# Patient Record
Sex: FEMALE | URBAN - METROPOLITAN AREA
[De-identification: names, ages, dates, MRNs, and addresses within clinical notes are randomized per-mention and may not be internally consistent; named-entity substitution may affect disease eponyms.]

---

## 2022-02-04 ENCOUNTER — WEB ENCOUNTER (OUTPATIENT)
Dept: URBAN - METROPOLITAN AREA CLINIC 54 | Facility: CLINIC | Age: 57
End: 2022-02-04

## 2022-02-04 ENCOUNTER — OFFICE VISIT (OUTPATIENT)
Dept: URBAN - METROPOLITAN AREA CLINIC 54 | Facility: CLINIC | Age: 57
End: 2022-02-04
Payer: COMMERCIAL

## 2022-02-04 VITALS
DIASTOLIC BLOOD PRESSURE: 69 MMHG | HEART RATE: 77 BPM | WEIGHT: 210 LBS | HEIGHT: 67 IN | SYSTOLIC BLOOD PRESSURE: 122 MMHG | TEMPERATURE: 97.3 F | BODY MASS INDEX: 32.96 KG/M2

## 2022-02-04 DIAGNOSIS — K59.09 CHANGE IN BOWEL MOVEMENTS INTERMITTENT CONSTIPATION. URGENCY IN THE MORNING.: ICD-10-CM

## 2022-02-04 DIAGNOSIS — R13.19 ESOPHAGEAL DYSPHAGIA: ICD-10-CM

## 2022-02-04 DIAGNOSIS — K21.9 GASTROESOPHAGEAL REFLUX DISEASE, UNSPECIFIED WHETHER ESOPHAGITIS PRESENT: ICD-10-CM

## 2022-02-04 PROCEDURE — 99244 OFF/OP CNSLTJ NEW/EST MOD 40: CPT

## 2022-02-04 PROCEDURE — 99204 OFFICE O/P NEW MOD 45 MIN: CPT

## 2022-02-04 RX ORDER — EMPAGLIFLOZIN 25 MG/1
1 TABLET TABLET, FILM COATED ORAL ONCE A DAY
Status: ACTIVE | COMMUNITY

## 2022-02-04 RX ORDER — ASPIRIN 81 MG/1
1 TABLET TABLET, COATED ORAL ONCE A DAY
Status: ACTIVE | COMMUNITY

## 2022-02-04 RX ORDER — OMEPRAZOLE 20 MG/1
1 CAPSULE 30 MINUTES BEFORE MORNING MEAL CAPSULE, DELAYED RELEASE ORAL ONCE A DAY
Qty: 90 | Refills: 1 | OUTPATIENT
Start: 2022-02-04

## 2022-02-04 RX ORDER — INSULIN DETEMIR 100 [IU]/ML
AS DIRECTED INJECTION, SOLUTION SUBCUTANEOUS
Status: ACTIVE | COMMUNITY

## 2022-02-04 RX ORDER — LISINOPRIL 40 MG/1
1 TABLET TABLET ORAL ONCE A DAY
Status: ACTIVE | COMMUNITY

## 2022-02-04 RX ORDER — PREGABALIN 100 MG/1
1 CAPSULE CAPSULE ORAL ONCE A DAY
Status: ACTIVE | COMMUNITY

## 2022-02-04 RX ORDER — ATORVASTATIN CALCIUM 80 MG/1
1 TABLET TABLET, FILM COATED ORAL ONCE A DAY
Status: ACTIVE | COMMUNITY

## 2022-02-04 RX ORDER — AMLODIPINE BESYLATE 10 MG/1
1 TABLET TABLET ORAL ONCE A DAY
Status: ACTIVE | COMMUNITY

## 2022-02-04 RX ORDER — INSULIN ASPART 100 [IU]/ML
AS DIRECTED INJECTION, SOLUTION INTRAVENOUS; SUBCUTANEOUS
Status: ACTIVE | COMMUNITY

## 2022-02-04 NOTE — HPI-TODAY'S VISIT:
Patient was referred by Dr. Micheal Blunt for dysphagia. A copy of this document will be sent to the physician. Pt is a 55 yo female with PMH of DM, HIV, and MGUS who complains of reflux, dysphagia, lower abd pain, and constipation. Pt feels like food gets stuck when she swallows, worse with meats. She has not had a food impaction requiring ED visit. Pt does not take anything for heartburn. Pt also complains of constant, dull lower abd pain and constipation. Pain improves with BMs. Has a BM about every 4 days, they are hard and painful with a lot of straining. No bloody stools, melena, or unintentional weight loss. Pt has tried Miralax, dulxolax, exlax, stool softeners, mag citrate without relief. Last EGD was 2020 and was normal. Last colonoscopy was 5640-2451 without polyps. No family history of colon cancer. Pt denies cardiopulmonary disease, anticoagulants or defibrillator. On aspiring 81mg.

## 2022-02-10 ENCOUNTER — OFFICE VISIT (OUTPATIENT)
Dept: URBAN - METROPOLITAN AREA SURGERY CENTER 14 | Facility: SURGERY CENTER | Age: 57
End: 2022-02-10
Payer: COMMERCIAL

## 2022-02-10 DIAGNOSIS — B96.81 BACTERIAL INFECTION DUE TO H. PYLORI: ICD-10-CM

## 2022-02-10 DIAGNOSIS — R13.19 CERVICAL DYSPHAGIA: ICD-10-CM

## 2022-02-10 DIAGNOSIS — K29.60 ADENOPAPILLOMATOSIS GASTRICA: ICD-10-CM

## 2022-02-10 PROCEDURE — 43248 EGD GUIDE WIRE INSERTION: CPT | Performed by: INTERNAL MEDICINE

## 2022-02-10 PROCEDURE — 43239 EGD BIOPSY SINGLE/MULTIPLE: CPT | Performed by: INTERNAL MEDICINE

## 2022-02-10 PROCEDURE — G8907 PT DOC NO EVENTS ON DISCHARG: HCPCS | Performed by: INTERNAL MEDICINE

## 2022-02-17 ENCOUNTER — TELEPHONE ENCOUNTER (OUTPATIENT)
Dept: URBAN - METROPOLITAN AREA CLINIC 92 | Facility: CLINIC | Age: 57
End: 2022-02-17

## 2022-02-24 ENCOUNTER — OFFICE VISIT (OUTPATIENT)
Dept: URBAN - METROPOLITAN AREA CLINIC 54 | Facility: CLINIC | Age: 57
End: 2022-02-24
Payer: COMMERCIAL

## 2022-02-24 VITALS
SYSTOLIC BLOOD PRESSURE: 112 MMHG | BODY MASS INDEX: 33.46 KG/M2 | DIASTOLIC BLOOD PRESSURE: 67 MMHG | HEIGHT: 67 IN | HEART RATE: 84 BPM | TEMPERATURE: 97.3 F | WEIGHT: 213.2 LBS

## 2022-02-24 DIAGNOSIS — A04.8 H. PYLORI INFECTION: ICD-10-CM

## 2022-02-24 DIAGNOSIS — K21.9 GASTROESOPHAGEAL REFLUX DISEASE, UNSPECIFIED WHETHER ESOPHAGITIS PRESENT: ICD-10-CM

## 2022-02-24 DIAGNOSIS — K59.00 CONSTIPATION, UNSPECIFIED CONSTIPATION TYPE: ICD-10-CM

## 2022-02-24 PROCEDURE — 99214 OFFICE O/P EST MOD 30 MIN: CPT

## 2022-02-24 RX ORDER — OMEPRAZOLE 20 MG/1
1 CAPSULE 30 MINUTES BEFORE MORNING MEAL CAPSULE, DELAYED RELEASE ORAL ONCE A DAY
Qty: 90 | Refills: 1 | Status: ACTIVE | COMMUNITY
Start: 2022-02-04

## 2022-02-24 RX ORDER — LISINOPRIL 40 MG/1
1 TABLET TABLET ORAL ONCE A DAY
Status: ACTIVE | COMMUNITY

## 2022-02-24 RX ORDER — AMLODIPINE BESYLATE 10 MG/1
1 TABLET TABLET ORAL ONCE A DAY
Status: ACTIVE | COMMUNITY

## 2022-02-24 RX ORDER — PREGABALIN 100 MG/1
1 CAPSULE CAPSULE ORAL ONCE A DAY
Status: ACTIVE | COMMUNITY

## 2022-02-24 RX ORDER — INSULIN ASPART 100 [IU]/ML
AS DIRECTED INJECTION, SOLUTION INTRAVENOUS; SUBCUTANEOUS
Status: ACTIVE | COMMUNITY

## 2022-02-24 RX ORDER — EMPAGLIFLOZIN 25 MG/1
1 TABLET TABLET, FILM COATED ORAL ONCE A DAY
Status: ACTIVE | COMMUNITY

## 2022-02-24 RX ORDER — INSULIN DETEMIR 100 [IU]/ML
AS DIRECTED INJECTION, SOLUTION SUBCUTANEOUS
Status: ACTIVE | COMMUNITY

## 2022-02-24 RX ORDER — ATORVASTATIN CALCIUM 80 MG/1
1 TABLET TABLET, FILM COATED ORAL ONCE A DAY
Status: ACTIVE | COMMUNITY

## 2022-02-24 RX ORDER — FLUCONAZOLE 150 MG/1
1 TABLET TABLET ORAL
Qty: 1 | Refills: 0 | OUTPATIENT
Start: 2022-02-24 | End: 2022-02-25

## 2022-02-24 NOTE — HPI-TODAY'S VISIT:
2/4/22: Patient was referred by Dr. Micheal Blunt for dysphagia. A copy of this document will be sent to the physician. Pt is a 55 yo female with PMH of DM, HIV, and MGUS who complains of reflux, dysphagia, lower abd pain, and constipation. Pt feels like food gets stuck when she swallows, worse with meats. She has not had a food impaction requiring ED visit. Pt does not take anything for heartburn. Pt also complains of constant, dull lower abd pain and constipation. Pain improves with BMs. Has a BM about every 4 days, they are hard and painful with a lot of straining. No bloody stools, melena, or unintentional weight loss. Pt has tried Miralax, dulxolax, exlax, stool softeners, mag citrate without relief. Last EGD was 2020 and was normal. Last colonoscopy was 7507-0150 without polyps. No family history of colon cancer. Pt denies cardiopulmonary disease, anticoagulants or defibrillator. On aspiring 81mg.   2/24/22 Follow up: Pt here for EGD follow up. Noted to have chronic active gastritis and H pylori postitive on EGD. Pt started Pylera for H pylori on 2/21. Feeling well overall. Has not tried Metamucil/Miralax or Linzess because BMs have improved. No constipation.  EGD 2/10/22:  - No endoscopic esophageal abnormality to explain patient's dysphagia. Esophagus dilated with 54 F Savary. No resistance or heme noted. - Z-line regular, 40 cm from the incisors. - Erythematous mucosa in the stomach. Biopsied. - Normal examined duodenum. Biopsy: chronic active gastritis, + H pylori

## 2022-10-21 ENCOUNTER — TELEPHONE ENCOUNTER (OUTPATIENT)
Dept: URBAN - METROPOLITAN AREA CLINIC 54 | Facility: CLINIC | Age: 57
End: 2022-10-21

## 2022-10-21 RX ORDER — LINACLOTIDE 145 UG/1
1 CAPSULE AT LEAST 30 MINUTES BEFORE THE FIRST MEAL OF THE DAY ON AN EMPTY STOMACH CAPSULE, GELATIN COATED ORAL ONCE A DAY
Qty: 30 | Refills: 3 | OUTPATIENT
Start: 2022-10-24 | End: 2023-02-20

## 2023-02-23 ENCOUNTER — OFFICE VISIT (OUTPATIENT)
Dept: URBAN - METROPOLITAN AREA CLINIC 54 | Facility: CLINIC | Age: 58
End: 2023-02-23
Payer: COMMERCIAL

## 2023-02-23 ENCOUNTER — DASHBOARD ENCOUNTERS (OUTPATIENT)
Age: 58
End: 2023-02-23

## 2023-02-23 VITALS
HEIGHT: 67 IN | HEART RATE: 96 BPM | DIASTOLIC BLOOD PRESSURE: 70 MMHG | TEMPERATURE: 97.5 F | BODY MASS INDEX: 35.63 KG/M2 | WEIGHT: 227 LBS | SYSTOLIC BLOOD PRESSURE: 126 MMHG

## 2023-02-23 DIAGNOSIS — K59.00 CONSTIPATION, UNSPECIFIED CONSTIPATION TYPE: ICD-10-CM

## 2023-02-23 DIAGNOSIS — K21.9 GASTROESOPHAGEAL REFLUX DISEASE, UNSPECIFIED WHETHER ESOPHAGITIS PRESENT: ICD-10-CM

## 2023-02-23 DIAGNOSIS — R10.30 LOWER ABDOMINAL PAIN: ICD-10-CM

## 2023-02-23 DIAGNOSIS — A04.8 H. PYLORI INFECTION: ICD-10-CM

## 2023-02-23 PROBLEM — 235595009: Status: ACTIVE | Noted: 2022-02-04

## 2023-02-23 PROBLEM — 14760008: Status: ACTIVE | Noted: 2022-02-04

## 2023-02-23 PROCEDURE — 99213 OFFICE O/P EST LOW 20 MIN: CPT

## 2023-02-23 RX ORDER — AMLODIPINE BESYLATE 10 MG/1
1 TABLET TABLET ORAL ONCE A DAY
Status: ACTIVE | COMMUNITY

## 2023-02-23 RX ORDER — LISINOPRIL 40 MG/1
1 TABLET TABLET ORAL ONCE A DAY
Status: ACTIVE | COMMUNITY

## 2023-02-23 RX ORDER — INSULIN DETEMIR 100 [IU]/ML
AS DIRECTED INJECTION, SOLUTION SUBCUTANEOUS
Status: ACTIVE | COMMUNITY

## 2023-02-23 RX ORDER — INSULIN ASPART 100 [IU]/ML
AS DIRECTED INJECTION, SOLUTION INTRAVENOUS; SUBCUTANEOUS
Status: ACTIVE | COMMUNITY

## 2023-02-23 RX ORDER — PREGABALIN 100 MG/1
1 CAPSULE CAPSULE ORAL ONCE A DAY
Status: ACTIVE | COMMUNITY

## 2023-02-23 RX ORDER — ATORVASTATIN CALCIUM 80 MG/1
1 TABLET TABLET, FILM COATED ORAL ONCE A DAY
Status: ACTIVE | COMMUNITY

## 2023-02-23 RX ORDER — DICYCLOMINE HYDROCHLORIDE 20 MG/1
1 TABLET TABLET ORAL
Qty: 90 | Refills: 0 | OUTPATIENT
Start: 2023-02-23 | End: 2023-03-25

## 2023-02-23 RX ORDER — DULAGLUTIDE 0.75 MG/.5ML
AS DIRECTED INJECTION, SOLUTION SUBCUTANEOUS
Status: ACTIVE | COMMUNITY

## 2023-02-23 RX ORDER — LINACLOTIDE 145 UG/1
1 CAPSULE AT LEAST 30 MINUTES BEFORE THE FIRST MEAL OF THE DAY ON AN EMPTY STOMACH CAPSULE, GELATIN COATED ORAL ONCE A DAY
Qty: 30 | Refills: 6
Start: 2022-10-24 | End: 2023-09-21

## 2023-02-23 NOTE — HPI-TODAY'S VISIT:
2/4/22: Patient was referred by Dr. Micheal Blunt for dysphagia. A copy of this document will be sent to the physician. Pt is a 55 yo female with PMH of DM, HIV, and MGUS who complains of reflux, dysphagia, lower abd pain, and constipation. Pt feels like food gets stuck when she swallows, worse with meats. She has not had a food impaction requiring ED visit. Pt does not take anything for heartburn. Pt also complains of constant, dull lower abd pain and constipation. Pain improves with BMs. Has a BM about every 4 days, they are hard and painful with a lot of straining. No bloody stools, melena, or unintentional weight loss. Pt has tried Miralax, dulxolax, exlax, stool softeners, mag citrate without relief. Last EGD was 2020 and was normal. Last colonoscopy was 7728-7276 without polyps. No family history of colon cancer. Pt denies cardiopulmonary disease, anticoagulants or defibrillator. On aspiring 81mg.   2/24/22 Follow up: Pt here for EGD follow up. Noted to have chronic active gastritis and H pylori postitive on EGD. Pt started Pylera for H pylori on 2/21. Feeling well overall. Has not tried Metamucil/Miralax or Linzess because BMs have improved. No constipation.  EGD 2/10/22:  - No endoscopic esophageal abnormality to explain patient's dysphagia. Esophagus dilated with 54 F Savary. No resistance or heme noted. - Z-line regular, 40 cm from the incisors. - Erythematous mucosa in the stomach. Biopsied. - Normal examined duodenum. Biopsy: chronic active gastritis, + H pylori  2/23/23 Follow up: Pt presents for follow up. H pylori breath test was negative. Constipation has improved with Linzess, but pt is only taking it every other day bc she feels cleaned out. Does not have diarrhea and feels like dose is appropriate. Does report dull, general lower abd aching pain in the morning, that occurs every few days. Not related to foods. When pain comes on it is constant and moderate. No hematochezia or melena. UTD on colon cancer screening.

## 2023-04-24 ENCOUNTER — OFFICE VISIT (OUTPATIENT)
Dept: URBAN - METROPOLITAN AREA CLINIC 54 | Facility: CLINIC | Age: 58
End: 2023-04-24

## 2023-11-18 ENCOUNTER — ERX REFILL RESPONSE (OUTPATIENT)
Dept: URBAN - METROPOLITAN AREA CLINIC 54 | Facility: CLINIC | Age: 58
End: 2023-11-18

## 2023-11-18 RX ORDER — LINACLOTIDE 145 UG/1
TAKE 1 CAPSULE BY MOUTH EVERY DAY 30 MINUTES BEFORE FIRST MEAL OF THE DAY ON AN EMPTY STOMACH CAPSULE, GELATIN COATED ORAL
Qty: 30 CAPSULE | Refills: 0 | OUTPATIENT

## 2023-11-18 RX ORDER — LINACLOTIDE 145 UG/1
TAKE 1 CAPSULE BY MOUTH EVERY DAY 30 MINUTES BEFORE FIRST MEAL OF THE DAY ON AN EMPTY STOMACH CAPSULE, GELATIN COATED ORAL
Qty: 30 CAPSULE | Refills: 2 | OUTPATIENT